# Patient Record
Sex: MALE | Race: WHITE | NOT HISPANIC OR LATINO | URBAN - METROPOLITAN AREA
[De-identification: names, ages, dates, MRNs, and addresses within clinical notes are randomized per-mention and may not be internally consistent; named-entity substitution may affect disease eponyms.]

---

## 2017-01-01 ENCOUNTER — INPATIENT (INPATIENT)
Facility: HOSPITAL | Age: 67
LOS: 3 days | End: 2017-12-30
Attending: INTERNAL MEDICINE | Admitting: INTERNAL MEDICINE

## 2017-01-01 VITALS
WEIGHT: 131.62 LBS | DIASTOLIC BLOOD PRESSURE: 70 MMHG | TEMPERATURE: 98 F | OXYGEN SATURATION: 98 % | SYSTOLIC BLOOD PRESSURE: 94 MMHG | RESPIRATION RATE: 18 BRPM | HEART RATE: 116 BPM

## 2017-01-01 VITALS
TEMPERATURE: 97 F | DIASTOLIC BLOOD PRESSURE: 43 MMHG | SYSTOLIC BLOOD PRESSURE: 71 MMHG | HEART RATE: 99 BPM | RESPIRATION RATE: 20 BRPM

## 2017-01-01 DIAGNOSIS — R10.9 UNSPECIFIED ABDOMINAL PAIN: ICD-10-CM

## 2017-01-01 DIAGNOSIS — F32.9 MAJOR DEPRESSIVE DISORDER, SINGLE EPISODE, UNSPECIFIED: ICD-10-CM

## 2017-01-01 DIAGNOSIS — F34.1 DYSTHYMIC DISORDER: ICD-10-CM

## 2017-01-01 DIAGNOSIS — F41.9 ANXIETY DISORDER, UNSPECIFIED: ICD-10-CM

## 2017-01-01 DIAGNOSIS — D41.4 NEOPLASM OF UNCERTAIN BEHAVIOR OF BLADDER: Chronic | ICD-10-CM

## 2017-01-01 DIAGNOSIS — R06.00 DYSPNEA, UNSPECIFIED: ICD-10-CM

## 2017-01-01 DIAGNOSIS — C25.9 MALIGNANT NEOPLASM OF PANCREAS, UNSPECIFIED: ICD-10-CM

## 2017-01-01 RX ORDER — DEXAMETHASONE 0.5 MG/5ML
10 ELIXIR ORAL ONCE
Qty: 0 | Refills: 0 | Status: COMPLETED | OUTPATIENT
Start: 2017-01-01 | End: 2017-01-01

## 2017-01-01 RX ORDER — POLYETHYLENE GLYCOL 3350 17 G/17G
17 POWDER, FOR SOLUTION ORAL DAILY
Qty: 0 | Refills: 0 | Status: DISCONTINUED | OUTPATIENT
Start: 2017-01-01 | End: 2017-01-01

## 2017-01-01 RX ORDER — LACTULOSE 10 G/15ML
20 SOLUTION ORAL AT BEDTIME
Qty: 0 | Refills: 0 | Status: DISCONTINUED | OUTPATIENT
Start: 2017-01-01 | End: 2017-01-01

## 2017-01-01 RX ORDER — MORPHINE SULFATE 50 MG/1
4 CAPSULE, EXTENDED RELEASE ORAL
Qty: 100 | Refills: 0 | Status: DISCONTINUED | OUTPATIENT
Start: 2017-01-01 | End: 2017-01-01

## 2017-01-01 RX ORDER — MORPHINE SULFATE 50 MG/1
1 CAPSULE, EXTENDED RELEASE ORAL EVERY 4 HOURS
Qty: 0 | Refills: 0 | Status: DISCONTINUED | OUTPATIENT
Start: 2017-01-01 | End: 2017-01-01

## 2017-01-01 RX ORDER — TAMSULOSIN HYDROCHLORIDE 0.4 MG/1
0.4 CAPSULE ORAL AT BEDTIME
Qty: 0 | Refills: 0 | Status: DISCONTINUED | OUTPATIENT
Start: 2017-01-01 | End: 2017-01-01

## 2017-01-01 RX ORDER — DEXAMETHASONE 0.5 MG/5ML
4 ELIXIR ORAL DAILY
Qty: 0 | Refills: 0 | Status: DISCONTINUED | OUTPATIENT
Start: 2017-01-01 | End: 2017-01-01

## 2017-01-01 RX ORDER — MORPHINE SULFATE 50 MG/1
1 CAPSULE, EXTENDED RELEASE ORAL
Qty: 100 | Refills: 0 | Status: DISCONTINUED | OUTPATIENT
Start: 2017-01-01 | End: 2017-01-01

## 2017-01-01 RX ORDER — ROBINUL 0.2 MG/ML
0.4 INJECTION INTRAMUSCULAR; INTRAVENOUS EVERY 8 HOURS
Qty: 0 | Refills: 0 | Status: DISCONTINUED | OUTPATIENT
Start: 2017-01-01 | End: 2017-01-01

## 2017-01-01 RX ORDER — SODIUM CHLORIDE 9 MG/ML
1000 INJECTION, SOLUTION INTRAVENOUS
Qty: 0 | Refills: 0 | Status: DISCONTINUED | OUTPATIENT
Start: 2017-01-01 | End: 2017-01-01

## 2017-01-01 RX ORDER — DEXAMETHASONE 0.5 MG/5ML
4 ELIXIR ORAL
Qty: 0 | Refills: 0 | Status: DISCONTINUED | OUTPATIENT
Start: 2017-01-01 | End: 2017-01-01

## 2017-01-01 RX ORDER — MORPHINE SULFATE 50 MG/1
2 CAPSULE, EXTENDED RELEASE ORAL
Qty: 100 | Refills: 0 | Status: DISCONTINUED | OUTPATIENT
Start: 2017-01-01 | End: 2017-01-01

## 2017-01-01 RX ORDER — MORPHINE SULFATE 50 MG/1
2 CAPSULE, EXTENDED RELEASE ORAL
Qty: 0 | Refills: 0 | Status: DISCONTINUED | OUTPATIENT
Start: 2017-01-01 | End: 2017-01-01

## 2017-01-01 RX ORDER — PANTOPRAZOLE SODIUM 20 MG/1
40 TABLET, DELAYED RELEASE ORAL
Qty: 0 | Refills: 0 | Status: DISCONTINUED | OUTPATIENT
Start: 2017-01-01 | End: 2017-01-01

## 2017-01-01 RX ORDER — DEXAMETHASONE 0.5 MG/5ML
10 ELIXIR ORAL ONCE
Qty: 0 | Refills: 0 | Status: DISCONTINUED | OUTPATIENT
Start: 2017-01-01 | End: 2017-01-01

## 2017-01-01 RX ORDER — RIVAROXABAN 15 MG-20MG
20 KIT ORAL EVERY 24 HOURS
Qty: 0 | Refills: 0 | Status: DISCONTINUED | OUTPATIENT
Start: 2017-01-01 | End: 2017-01-01

## 2017-01-01 RX ORDER — ONDANSETRON 8 MG/1
4 TABLET, FILM COATED ORAL EVERY 8 HOURS
Qty: 0 | Refills: 0 | Status: DISCONTINUED | OUTPATIENT
Start: 2017-01-01 | End: 2017-01-01

## 2017-01-01 RX ORDER — DIPHENHYDRAMINE HCL 50 MG
25 CAPSULE ORAL AT BEDTIME
Qty: 0 | Refills: 0 | Status: DISCONTINUED | OUTPATIENT
Start: 2017-01-01 | End: 2017-01-01

## 2017-01-01 RX ORDER — MORPHINE SULFATE 50 MG/1
6 CAPSULE, EXTENDED RELEASE ORAL
Qty: 100 | Refills: 0 | Status: DISCONTINUED | OUTPATIENT
Start: 2017-01-01 | End: 2017-01-01

## 2017-01-01 RX ADMIN — ONDANSETRON 4 MILLIGRAM(S): 8 TABLET, FILM COATED ORAL at 05:54

## 2017-01-01 RX ADMIN — SODIUM CHLORIDE 30 MILLILITER(S): 9 INJECTION, SOLUTION INTRAVENOUS at 17:15

## 2017-01-01 RX ADMIN — RIVAROXABAN 20 MILLIGRAM(S): KIT at 17:23

## 2017-01-01 RX ADMIN — MORPHINE SULFATE 6 MG/HR: 50 CAPSULE, EXTENDED RELEASE ORAL at 23:49

## 2017-01-01 RX ADMIN — TAMSULOSIN HYDROCHLORIDE 0.4 MILLIGRAM(S): 0.4 CAPSULE ORAL at 21:54

## 2017-01-01 RX ADMIN — Medication 600 MILLIGRAM(S): at 06:01

## 2017-01-01 RX ADMIN — Medication 50 MILLIGRAM(S): at 21:24

## 2017-01-01 RX ADMIN — ROBINUL 0.4 MILLIGRAM(S): 0.2 INJECTION INTRAMUSCULAR; INTRAVENOUS at 07:55

## 2017-01-01 RX ADMIN — RIVAROXABAN 20 MILLIGRAM(S): KIT at 17:13

## 2017-01-01 RX ADMIN — MORPHINE SULFATE 4 MG/HR: 50 CAPSULE, EXTENDED RELEASE ORAL at 14:00

## 2017-01-01 RX ADMIN — TAMSULOSIN HYDROCHLORIDE 0.4 MILLIGRAM(S): 0.4 CAPSULE ORAL at 23:16

## 2017-01-01 RX ADMIN — MORPHINE SULFATE 6 MG/HR: 50 CAPSULE, EXTENDED RELEASE ORAL at 07:53

## 2017-01-01 RX ADMIN — Medication 4 MILLIGRAM(S): at 05:51

## 2017-01-01 RX ADMIN — Medication 50 MILLIGRAM(S): at 23:16

## 2017-01-01 RX ADMIN — PANTOPRAZOLE SODIUM 40 MILLIGRAM(S): 20 TABLET, DELAYED RELEASE ORAL at 07:50

## 2017-01-01 RX ADMIN — Medication 4 MILLIGRAM(S): at 05:21

## 2017-01-01 RX ADMIN — PANTOPRAZOLE SODIUM 40 MILLIGRAM(S): 20 TABLET, DELAYED RELEASE ORAL at 08:08

## 2017-01-01 RX ADMIN — Medication 4 MILLIGRAM(S): at 05:38

## 2017-01-01 RX ADMIN — PANTOPRAZOLE SODIUM 40 MILLIGRAM(S): 20 TABLET, DELAYED RELEASE ORAL at 06:01

## 2017-01-01 RX ADMIN — MORPHINE SULFATE 2 MG/HR: 50 CAPSULE, EXTENDED RELEASE ORAL at 19:05

## 2017-01-01 RX ADMIN — MORPHINE SULFATE 1 MILLIGRAM(S): 50 CAPSULE, EXTENDED RELEASE ORAL at 17:00

## 2017-01-01 RX ADMIN — Medication 600 MILLIGRAM(S): at 17:24

## 2017-01-01 RX ADMIN — Medication 102 MILLIGRAM(S): at 15:34

## 2017-01-01 RX ADMIN — MORPHINE SULFATE 6 MG/HR: 50 CAPSULE, EXTENDED RELEASE ORAL at 08:11

## 2017-01-01 RX ADMIN — SODIUM CHLORIDE 20 MILLILITER(S): 9 INJECTION, SOLUTION INTRAVENOUS at 08:11

## 2017-01-01 RX ADMIN — SODIUM CHLORIDE 30 MILLILITER(S): 9 INJECTION, SOLUTION INTRAVENOUS at 19:06

## 2017-01-01 RX ADMIN — TAMSULOSIN HYDROCHLORIDE 0.4 MILLIGRAM(S): 0.4 CAPSULE ORAL at 21:25

## 2017-01-01 RX ADMIN — MORPHINE SULFATE 4 MG/HR: 50 CAPSULE, EXTENDED RELEASE ORAL at 13:45

## 2017-01-01 RX ADMIN — Medication 4 MILLIGRAM(S): at 16:59

## 2017-01-01 RX ADMIN — MORPHINE SULFATE 1 MILLIGRAM(S): 50 CAPSULE, EXTENDED RELEASE ORAL at 16:42

## 2017-01-01 RX ADMIN — ROBINUL 0.4 MILLIGRAM(S): 0.2 INJECTION INTRAMUSCULAR; INTRAVENOUS at 23:27

## 2017-01-01 RX ADMIN — ONDANSETRON 4 MILLIGRAM(S): 8 TABLET, FILM COATED ORAL at 20:21

## 2017-01-01 RX ADMIN — Medication 50 MILLIGRAM(S): at 21:25

## 2017-01-01 RX ADMIN — RIVAROXABAN 20 MILLIGRAM(S): KIT at 19:04

## 2017-12-27 NOTE — H&P ADULT - FAMILY HISTORY
Family history of multiple myeloma     Mother  Still living? Unknown  Family history of lung cancer, Age at diagnosis: Age Unknown

## 2017-12-27 NOTE — H&P ADULT - PMH
Anxiety    Bladder polyps  resection x2 done   3rd 04/2015  Bladder tumor    BPH (benign prostatic hypertrophy)    Cancer of pancreas    Current smoker  1ppd X 20 years  Depression    Duodenal ulcer  1967. surgically treated  Hard of hearing, bilateral    Hypogonadism male    Low testosterone    Male erectile disorder of organic origin    Osteoarthritis    PTSD (post-traumatic stress disorder)    Tobacco abuse  Current smoker :  1ppdX 20 years

## 2017-12-27 NOTE — H&P ADULT - PSH
Bladder polyps  TURBT x 2 - 2011, 11/2012 08/2014/ '2015  Deviated septum  surgical correction  H/O non-cataract eye surgery  7/2014 laser procedure left eye - to clear up lens cloudiness  H/O: duodenal ulcer  laparotomy & resection @ age 19  Lesion of mouth  surgical excision, benign - throat  S/P arthroscopic knee surgery  multiple bilateral:  '60's-'80's  S/P cataract surgery  left:  '90's  laser  S/P gastrectomy  1967  S/P knee surgery  left - 1969 - open  S/P rotator cuff surgery  bilateral.  '90's

## 2017-12-27 NOTE — H&P ADULT - ASSESSMENT
67 year old male with cancer of the pancreas metastatic to liver and nodes with increased pain and nausea

## 2017-12-27 NOTE — H&P ADULT - HISTORY OF PRESENT ILLNESS
67 year old male with pancreatic cancer status post chemotherapy from 2/17 through5/17 now no further treatment is planned.  Patient on home hospice but experiencing increased pain unrelieved by current medications and nausea unrelieved by current medication.  30 lb weight loss in 6 months

## 2017-12-28 NOTE — PROGRESS NOTE ADULT - ASSESSMENT
67 year old male with pancreatic cancer status post chemotherapy from 2/17 through5/17 now no further treatment is planned.  Patient on home hospice but experiencing increased pain unrelieved by current medications and nausea unrelieved by current medication.  30 lb weight loss in 6 months (27 Dec 2017 08:34)     12/28/17 Pt continues to have midsternal chest pain "burning", dyspnea at rest, copious tenacious yellow secretions and pt is self suctioning frequently with a yankauer catheter. also having intermittent nausea and very poor appetite, little PO intake.  When asked what is most important to him today the pt states "to breath better". discussed with pt and then his wife later about signs of disease progression, methods of symptom management and side effects of treatment with opioids. They are both in agreement with following POC

## 2017-12-28 NOTE — PROGRESS NOTE ADULT - SUBJECTIVE AND OBJECTIVE BOX
67y  Male    Allergies    codeine (Rash)  penicillins (Seizure)    Intolerances    SYMPTOMS:  PAIN: yes, mid sternal  DYSPNEA: yes  SECRETIONS: yes, copious phlegm  NAUSEA/VOMITING: nausea, no vomiting  AGITATION:  no    PPSV2:     30    ACTIVE ISSUE/S REQUIRING INPATIENT ADMISSION:  dyspnea, pain    OVERNIGHT EVENTS:   Pt continues to have midsternal chest pain "burning", dyspnea at rest, copious tenacious yellow secretions and pt is self suctioning frequently with a yankauer catheter. also having intermittent nausea and very poor appetite, little PO intake    MEDICATIONS  (STANDING):  dextrose 5% + sodium chloride 0.45%. 1000 milliLiter(s) (30 mL/Hr) IV Continuous <Continuous>  morphine  Infusion 6 mG/Hr (6 mL/Hr) IV Continuous <Continuous>  pantoprazole    Tablet 40 milliGRAM(s) Oral before breakfast  pregabalin 50 milliGRAM(s) Oral at bedtime  rivaroxaban 20 milliGRAM(s) Oral every 24 hours  tamsulosin 0.4 milliGRAM(s) Oral at bedtime    MEDICATIONS  (PRN):  diphenhydrAMINE   Capsule 25 milliGRAM(s) Oral at bedtime PRN Insomnia  glycopyrrolate Injectable 0.4 milliGRAM(s) IV Push every 8 hours PRN increased secretions  guaiFENesin  milliGRAM(s) Oral every 12 hours PRN Cough  lactulose Syrup 20 Gram(s) Oral at bedtime PRN constipation  LORazepam    Concentrate 1 milliGRAM(s) Oral every 4 hours PRN Agitation  morphine  - Injectable 2 milliGRAM(s) IV Push every 3 hours PRN Severe Pain (7 - 10)  morphine  - Injectable 2 milliGRAM(s) IV Push every 1 hour PRN dyspnea  ondansetron Injectable 4 milliGRAM(s) IV Push every 8 hours PRN Nausea  polyethylene glycol 3350 17 Gram(s) Oral daily PRN Constipation      Vital Signs Last 24 Hrs  T(C): 33.1 (12-28-17 @ 07:56), Max: 36.1 (12-27-17 @ 21:11)  T(F): 91.6 (12-28-17 @ 07:56), Max: 97 (12-27-17 @ 21:11)  HR: 113 (12-28-17 @ 07:56) (98 - 113)  BP: 80/57 (12-28-17 @ 07:56) (80/57 - 97/67)  BP(mean): --  RR: 14 (12-28-17 @ 07:56) (14 - 18)  SpO2: 81% (12-28-17 @ 07:56) (81% - 95%)      PHYSICAL EXAM:    GENERAL:  pale, cachectic    HEENT: sunken cheeks, bitemporal wasting, speech is somewhat dysarthric    NECK: no JVD, supple    HEART:    tachycardic S1S2    LUNGS: clear at bases, coarse scattered rhonchi bilaterally      ABDOMEN:  scaphoid    EXTREMITIES: strength 4/5 throughout , wasted    NEURO: alert and oriented, forgetful

## 2017-12-28 NOTE — PROGRESS NOTE ADULT - PROBLEM SELECTOR PLAN 1
Hospice GIP level of care for pain management with IV opiate   Morphine drip increased to 6 mg/hr  PRNs available IV hourly  May consider switching to Dilaudid drip tomorrow, discussed with family and Dr. Landis

## 2017-12-28 NOTE — PROGRESS NOTE ADULT - PROBLEM SELECTOR PLAN 2
morphine drip and PRNs  supplemental O2 titrate to comfort  Add decadron 10 mg once today then 4 mg BID starting tomorrow  On GI prophy

## 2017-12-29 NOTE — PROGRESS NOTE ADULT - ASSESSMENT
67 year old with pancreatic cancer status post chemotherapy with progressing disease and weight loss.  Pain persists but nausea improving

## 2017-12-30 NOTE — DISCHARGE NOTE FOR THE EXPIRED PATIENT - HOSPITAL COURSE
67 year old male with pancreatic cancer status post chemotherapy now no further treatment is planned.  Patient on home hospice but experiencing increased pain unrelieved by current medications and nausea unrelieved by current medication.  30 lb weight loss in 6 months (27 Dec 2017 08:34)    Was asked by RN to pronounce pt. Pt seen and examined at bedside. Absent heart sounds, absent breath sounds. No pupillary reflex noted. Absent radial, carotid and femoral pulses. Pt pronounced  at 1:50. Wife Liz notified. RN to notify Dr. Landis.

## 2018-01-03 DIAGNOSIS — R13.10 DYSPHAGIA, UNSPECIFIED: ICD-10-CM

## 2018-01-03 DIAGNOSIS — L89.891 PRESSURE ULCER OF OTHER SITE, STAGE 1: ICD-10-CM

## 2018-01-03 DIAGNOSIS — Z66 DO NOT RESUSCITATE: ICD-10-CM

## 2018-01-03 DIAGNOSIS — F41.9 ANXIETY DISORDER, UNSPECIFIED: ICD-10-CM

## 2018-01-03 DIAGNOSIS — Z51.5 ENCOUNTER FOR PALLIATIVE CARE: ICD-10-CM

## 2018-01-03 DIAGNOSIS — R10.9 UNSPECIFIED ABDOMINAL PAIN: ICD-10-CM

## 2018-01-03 DIAGNOSIS — Z88.6 ALLERGY STATUS TO ANALGESIC AGENT: ICD-10-CM

## 2018-01-03 DIAGNOSIS — F17.210 NICOTINE DEPENDENCE, CIGARETTES, UNCOMPLICATED: ICD-10-CM

## 2018-01-03 DIAGNOSIS — Z88.0 ALLERGY STATUS TO PENICILLIN: ICD-10-CM

## 2018-01-03 DIAGNOSIS — Z92.21 PERSONAL HISTORY OF ANTINEOPLASTIC CHEMOTHERAPY: ICD-10-CM

## 2018-01-03 DIAGNOSIS — N40.0 BENIGN PROSTATIC HYPERPLASIA WITHOUT LOWER URINARY TRACT SYMPTOMS: ICD-10-CM

## 2018-01-03 DIAGNOSIS — C25.9 MALIGNANT NEOPLASM OF PANCREAS, UNSPECIFIED: ICD-10-CM

## 2018-01-03 DIAGNOSIS — R06.00 DYSPNEA, UNSPECIFIED: ICD-10-CM

## 2018-01-03 DIAGNOSIS — G89.3 NEOPLASM RELATED PAIN (ACUTE) (CHRONIC): ICD-10-CM

## 2018-01-03 DIAGNOSIS — C78.7 SECONDARY MALIGNANT NEOPLASM OF LIVER AND INTRAHEPATIC BILE DUCT: ICD-10-CM

## 2018-01-03 DIAGNOSIS — E29.1 TESTICULAR HYPOFUNCTION: ICD-10-CM

## 2018-01-03 DIAGNOSIS — F32.9 MAJOR DEPRESSIVE DISORDER, SINGLE EPISODE, UNSPECIFIED: ICD-10-CM

## 2018-01-03 DIAGNOSIS — R63.4 ABNORMAL WEIGHT LOSS: ICD-10-CM

## 2018-01-03 DIAGNOSIS — F34.1 DYSTHYMIC DISORDER: ICD-10-CM

## 2018-01-03 DIAGNOSIS — H91.93 UNSPECIFIED HEARING LOSS, BILATERAL: ICD-10-CM

## 2018-01-03 DIAGNOSIS — C77.2 SECONDARY AND UNSPECIFIED MALIGNANT NEOPLASM OF INTRA-ABDOMINAL LYMPH NODES: ICD-10-CM

## 2018-01-03 DIAGNOSIS — L89.152 PRESSURE ULCER OF SACRAL REGION, STAGE 2: ICD-10-CM

## 2018-01-03 DIAGNOSIS — M19.90 UNSPECIFIED OSTEOARTHRITIS, UNSPECIFIED SITE: ICD-10-CM

## 2023-11-30 NOTE — DISCHARGE NOTE FOR THE EXPIRED PATIENT - SECONDARY DIAGNOSIS.
Unilateral hip fracture Pending plan of care. Potential for IRF.    Abdominal pain Anxiety Current smoker Depression Dyspnea Dysthymia Hypogonadism male Hard of hearing, bilateral